# Patient Record
Sex: FEMALE | Race: WHITE
[De-identification: names, ages, dates, MRNs, and addresses within clinical notes are randomized per-mention and may not be internally consistent; named-entity substitution may affect disease eponyms.]

---

## 2017-01-30 ENCOUNTER — HOSPITAL ENCOUNTER (OUTPATIENT)
Dept: HOSPITAL 80 - BRMIMAGING | Age: 77
End: 2017-01-30
Attending: INTERNAL MEDICINE
Payer: COMMERCIAL

## 2017-01-30 DIAGNOSIS — Z13.820: Primary | ICD-10-CM

## 2017-01-30 DIAGNOSIS — M85.80: ICD-10-CM

## 2017-01-30 NOTE — DX
DEXA Bone Mineral Densitometry



Indication:  76-year-old woman with personal history of normal bone mineral density. Surveillance. Geoffrey cunningham currently takes supplemental calcium and multivitamins.



Comparison:  Different DEXA unit then in the June 2008 exam.



Technique:  Bone Mineral Densitometry (BMD) by Dual Energy X-Ray Absorptiometry (DEXA) was performed 
utilizing the [employer] scanner.  The lumbar spine was evaluated in the AP projection. The bilate
ral hips and left forearm were evaluated in the AP projection. Vertebral fracture assessment was also
 performed.



Findings:



AP Lumbar Spine:  The L1, L2, L3 and L4 vertebral bodies were evaluated.

BMD:  1.262 gm/cm2

T-score:  0.5 SD

Z-score: 2.1 SD



AP Left Hip: Neck

BMD:  0.871 gm/cm2

T-score:  -1.2 SD

Z-score:  0.6 SD



AP Right Hip: Neck

BMD:  0.893 gm/cm2

T-score:  -1 SD

Z-score:  0.7 SD



AP left Forearm, 1/3:

BMD:  0.789 gm/cm2

T-score:  -1 SD

Z-score:  1.2 SD



Vertebral Fracture Assessment:  No significant fracture deformity. No prevertebral aortic calcificati
on, significant marginal bone spurring, facet arthrosis,  or intrinsic vertebral body sclerosis that 
would affect the accuracy of the lumbar spine BMD measurement.



Conclusion: Considering the lowest measured site (left hip), the patient low bone mineral density wit
h a T-score of -1.2. The ten year risk for any major osteoporotic fracture is 10.3% and for a hip fra
cture is 1.7%. Any bone loss in this patient is probably related to aging or estrogen deficiency.



Recommendations:  To prevent osteoporosis and to promote bone density, consider the following recomme
ndations:



1.  Pursue a regular regimen of weightbearing and muscle-strengthening exercises in order to reduce t
he risk of falls and fracture (as tolerated by the patient's general medical condition).

2.  Ensure that total daily dietary calcium intake is maximized.

3.  Check serum hydroxy vitamin D3 (normal >30ng/ml).

4.  Ensure daily intake of vitamin D is 800 international units.

5.  Consider follow up DEXA scan in two years to assess the rate of bone loss in this patient.

6.  Consider excluding common secondary causes of bone loss.  Laboratory evaluation might include CBC
, TSH, calcium, phosphorous, albumin, creatinine, alkaline phosphatase, PTH, serum, electrophoresis (
SPEP or UPEP), antitissue transglutaminase antibody levels (celiac disease), and hydroxy vitamin D3, 
as well as a 24-hour urine calcium.

## 2017-05-15 ENCOUNTER — HOSPITAL ENCOUNTER (OUTPATIENT)
Dept: HOSPITAL 80 - CIMAGING | Age: 77
End: 2017-05-15
Attending: INTERNAL MEDICINE
Payer: COMMERCIAL

## 2017-05-15 DIAGNOSIS — R60.9: Primary | ICD-10-CM

## 2017-07-27 ENCOUNTER — HOSPITAL ENCOUNTER (OUTPATIENT)
Dept: HOSPITAL 80 - CIMAGING | Age: 77
End: 2017-07-27
Attending: INTERNAL MEDICINE
Payer: COMMERCIAL

## 2017-07-27 PROCEDURE — G0202 SCR MAMMO BI INCL CAD: HCPCS

## 2018-04-11 ENCOUNTER — HOSPITAL ENCOUNTER (OUTPATIENT)
Dept: HOSPITAL 80 - BHFA | Age: 78
End: 2018-04-11
Attending: INTERNAL MEDICINE
Payer: COMMERCIAL

## 2018-04-11 DIAGNOSIS — I25.10: Primary | ICD-10-CM

## 2018-04-11 PROCEDURE — A9500 TC99M SESTAMIBI: HCPCS

## 2018-04-11 PROCEDURE — 93017 CV STRESS TEST TRACING ONLY: CPT

## 2018-04-11 PROCEDURE — 78452 HT MUSCLE IMAGE SPECT MULT: CPT

## 2018-04-13 ENCOUNTER — HOSPITAL ENCOUNTER (OUTPATIENT)
Dept: HOSPITAL 80 - BHFA | Age: 78
End: 2018-04-13
Attending: INTERNAL MEDICINE
Payer: COMMERCIAL

## 2018-04-13 DIAGNOSIS — I73.9: Primary | ICD-10-CM

## 2018-07-30 ENCOUNTER — HOSPITAL ENCOUNTER (OUTPATIENT)
Dept: HOSPITAL 80 - CIMAGING | Age: 78
End: 2018-07-30
Attending: INTERNAL MEDICINE
Payer: COMMERCIAL

## 2018-07-30 DIAGNOSIS — Z12.31: Primary | ICD-10-CM

## 2018-07-30 DIAGNOSIS — Z85.3: ICD-10-CM
